# Patient Record
Sex: FEMALE | URBAN - METROPOLITAN AREA
[De-identification: names, ages, dates, MRNs, and addresses within clinical notes are randomized per-mention and may not be internally consistent; named-entity substitution may affect disease eponyms.]

---

## 2021-08-16 ENCOUNTER — OFFICE VISIT (OUTPATIENT)
Dept: URBAN - METROPOLITAN AREA CLINIC 6 | Facility: CLINIC | Age: 71
End: 2021-08-16
Payer: COMMERCIAL

## 2021-08-16 PROCEDURE — 76514 ECHO EXAM OF EYE THICKNESS: CPT | Performed by: OPTOMETRIST

## 2021-08-16 PROCEDURE — 92133 CPTRZD OPH DX IMG PST SGM ON: CPT | Performed by: OPTOMETRIST

## 2021-08-16 PROCEDURE — 92012 INTRM OPH EXAM EST PATIENT: CPT | Performed by: OPTOMETRIST

## 2021-08-16 PROCEDURE — 92083 EXTENDED VISUAL FIELD XM: CPT | Performed by: OPTOMETRIST

## 2021-08-16 ASSESSMENT — INTRAOCULAR PRESSURE
OD: 19
OS: 18

## 2021-08-16 NOTE — IMPRESSION/PLAN
Impression: Open angle with borderline findings, low risk, bilateral: H40.013. Plan: RNFL OCT scan shows thin quadrant sup OS. HVF 24-2 unreliable OD with nasal defects OD and reliable OS with nasal defects OS. Thick pachs OD>OS. Due to frequent antivegf injections will recheck in 6 moths.

## 2022-02-16 ENCOUNTER — OFFICE VISIT (OUTPATIENT)
Dept: URBAN - METROPOLITAN AREA CLINIC 6 | Facility: CLINIC | Age: 72
End: 2022-02-16
Payer: COMMERCIAL

## 2022-02-16 DIAGNOSIS — H40.013 OPEN ANGLE WITH BORDERLINE FINDINGS, LOW RISK, BILATERAL: Primary | ICD-10-CM

## 2022-02-16 PROCEDURE — 92133 CPTRZD OPH DX IMG PST SGM ON: CPT | Performed by: OPTOMETRIST

## 2022-02-16 PROCEDURE — 92083 EXTENDED VISUAL FIELD XM: CPT | Performed by: OPTOMETRIST

## 2022-02-16 PROCEDURE — 92012 INTRM OPH EXAM EST PATIENT: CPT | Performed by: OPTOMETRIST

## 2022-02-16 ASSESSMENT — INTRAOCULAR PRESSURE
OD: 22
OS: 22

## 2022-02-16 NOTE — IMPRESSION/PLAN
Impression: Open angle with borderline findings, low risk, bilateral: H40.013. Plan: RNFL OCT scan shows borderline inf quadrant OD and borderline sup OS. HVF 24-2 unreliable OD/OS with scattered non specific defects not repeatable.

## 2022-11-16 ENCOUNTER — OFFICE VISIT (OUTPATIENT)
Dept: URBAN - METROPOLITAN AREA CLINIC 6 | Facility: CLINIC | Age: 72
End: 2022-11-16
Payer: MEDICARE

## 2022-11-16 DIAGNOSIS — E11.3213 DIABETES MELLITUS TYPE 2 WITH MILD NON-PROLIFERATIVE RETINOPATHY WITH MACULAR EDEMA, BILATERAL: Primary | ICD-10-CM

## 2022-11-16 DIAGNOSIS — H40.013 OPEN ANGLE WITH BORDERLINE FINDINGS, LOW RISK, BILATERAL: ICD-10-CM

## 2022-11-16 DIAGNOSIS — H43.813 VITREOUS DEGENERATION, BILATERAL: ICD-10-CM

## 2022-11-16 DIAGNOSIS — H26.493 OTHER SECONDARY CATARACT, BILATERAL: ICD-10-CM

## 2022-11-16 PROCEDURE — 92014 COMPRE OPH EXAM EST PT 1/>: CPT | Performed by: OPTOMETRIST

## 2022-11-16 PROCEDURE — 92250 FUNDUS PHOTOGRAPHY W/I&R: CPT | Performed by: OPTOMETRIST

## 2022-11-16 ASSESSMENT — INTRAOCULAR PRESSURE
OS: 16
OD: 18

## 2022-11-16 NOTE — IMPRESSION/PLAN
Impression: Diabetes mellitus Type 2 with mild non-proliferative retinopathy with macular edema, bilateral: D47.7068. Plan: DM Type 2 with Mild NPDR with DME in both eyes- Under care of [facility]. Last appointment was about 4-5 months ago and next appointment will be tomorrow- CCrx with him.

## 2022-11-16 NOTE — IMPRESSION/PLAN
Impression: Open angle with borderline findings, low risk, bilateral: H40.013. Plan: Fundus photos ordered today show no change from previous. The pathophysiology of glaucoma and the difference between having glaucoma and being a glaucoma suspect were reviewed with the patient. All of the patients questions were answered and they stated they understand their finding. A Gamble 24-2 visual field and nerve fiber layer analysis by OCT, and fundus photos were ordered for next visit due to patient seeing [facility] twice a year.

## 2022-12-12 ENCOUNTER — OFFICE VISIT (OUTPATIENT)
Dept: URBAN - METROPOLITAN AREA CLINIC 6 | Facility: CLINIC | Age: 72
End: 2022-12-12
Payer: MEDICARE

## 2022-12-12 DIAGNOSIS — H20.012 PRIMARY IRIDOCYCLITIS OF LEFT EYE: ICD-10-CM

## 2022-12-12 PROCEDURE — 92012 INTRM OPH EXAM EST PATIENT: CPT | Performed by: OPTOMETRIST

## 2022-12-12 RX ORDER — BRIMONIDINE TARTRATE, TIMOLOL MALEATE 2; 5 MG/ML; MG/ML
SOLUTION/ DROPS OPHTHALMIC
Qty: 10 | Refills: 0 | Status: ACTIVE
Start: 2022-12-12

## 2022-12-12 RX ORDER — PREDNISOLONE ACETATE 10 MG/ML
1 % SUSPENSION/ DROPS OPHTHALMIC
Qty: 10 | Refills: 0 | Status: ACTIVE
Start: 2022-12-12

## 2022-12-12 RX ORDER — ACETAZOLAMIDE 250 MG/1
250 MG TABLET ORAL
Qty: 28 | Refills: 0 | Status: ACTIVE
Start: 2022-12-12

## 2022-12-12 ASSESSMENT — INTRAOCULAR PRESSURE
OS: 50
OD: 18

## 2022-12-12 NOTE — IMPRESSION/PLAN
Impression: Primary iridocyclitis of left eye: H20.012. Plan: Iritis from unknown etiology but may be related to diabetes and diabetic retinopathy. Start Prednisolone TID OS (shake well). Recommend consult with [address] [place] [address]. Plan to recheck here on Thursday for IOP check.

## 2022-12-12 NOTE — IMPRESSION/PLAN
Impression: Ocular hypertension, left eye: H40.052. Plan: Start Combigan TID OD and diamox 250 po BID.

## 2022-12-15 ENCOUNTER — OFFICE VISIT (OUTPATIENT)
Dept: URBAN - METROPOLITAN AREA CLINIC 6 | Facility: CLINIC | Age: 72
End: 2022-12-15
Payer: MEDICARE

## 2022-12-15 DIAGNOSIS — H40.052 OCULAR HYPERTENSION, LEFT EYE: Primary | ICD-10-CM

## 2022-12-15 PROCEDURE — 92012 INTRM OPH EXAM EST PATIENT: CPT | Performed by: OPTOMETRIST

## 2022-12-15 ASSESSMENT — INTRAOCULAR PRESSURE
OD: 10
OS: 12

## 2022-12-15 NOTE — IMPRESSION/PLAN
Impression: Ocular hypertension, left eye: H40.052. Plan: Significantly improved. Cont Combigan TID OD and diamox 250 po BID.

## 2022-12-15 NOTE — IMPRESSION/PLAN
Impression: Primary iridocyclitis of left eye: H20.012. Plan: Iritis from unknown etiology but may be related to diabetes and diabetic retinopathy. Increase Prednisolone 6 times a day OS (shake well). Recommend consult with [address] [place] [address].

## 2022-12-19 ENCOUNTER — OFFICE VISIT (OUTPATIENT)
Dept: URBAN - METROPOLITAN AREA CLINIC 6 | Facility: CLINIC | Age: 72
End: 2022-12-19
Payer: MEDICARE

## 2022-12-19 DIAGNOSIS — H20.012 PRIMARY IRIDOCYCLITIS OF LEFT EYE: Primary | ICD-10-CM

## 2022-12-19 DIAGNOSIS — H43.12 VITREOUS HEMORRHAGE, LEFT EYE: ICD-10-CM

## 2022-12-19 DIAGNOSIS — H40.052 OCULAR HYPERTENSION, LEFT EYE: ICD-10-CM

## 2022-12-19 PROCEDURE — 92014 COMPRE OPH EXAM EST PT 1/>: CPT | Performed by: OPTOMETRIST

## 2022-12-19 ASSESSMENT — INTRAOCULAR PRESSURE
OD: 13
OS: 18

## 2022-12-19 NOTE — IMPRESSION/PLAN
Impression: Vitreous hemorrhage, left eye: H43.12. Plan: Vitreous hemorrhage- Unable to perform B-scan due to no availability. Patient has appointment with [facility] on the 3rd week in January, but will try to be seen sooner.

## 2022-12-19 NOTE — IMPRESSION/PLAN
Impression: Primary iridocyclitis of left eye: H20.012. Plan: Iritis from unknown etiology but may be related to diabetes and diabetic retinopathy. Use Prednisolone 4 times a day OS (shake well). Recommend consult with [address] [place] [address].

## 2022-12-19 NOTE — IMPRESSION/PLAN
Impression: Ocular hypertension, left eye: H40.052. Plan: Significantly improved. IOP was 50 at ER visit 12/12/2022, now in the teens the last two visits. Continue Combigan TID OS and diamox 250 po.

## 2023-01-12 ENCOUNTER — OFFICE VISIT (OUTPATIENT)
Dept: URBAN - METROPOLITAN AREA CLINIC 6 | Facility: CLINIC | Age: 73
End: 2023-01-12
Payer: MEDICARE

## 2023-01-12 DIAGNOSIS — H43.12 VITREOUS HEMORRHAGE, LEFT EYE: ICD-10-CM

## 2023-01-12 DIAGNOSIS — H40.052 OCULAR HYPERTENSION, LEFT EYE: Primary | ICD-10-CM

## 2023-01-12 PROCEDURE — 92012 INTRM OPH EXAM EST PATIENT: CPT | Performed by: OPTOMETRIST

## 2023-01-12 RX ORDER — BRIMONIDINE TARTRATE, TIMOLOL MALEATE 2; 5 MG/ML; MG/ML
SOLUTION/ DROPS OPHTHALMIC
Qty: 5 | Refills: 2 | Status: ACTIVE
Start: 2023-01-12

## 2023-01-12 RX ORDER — ACETAZOLAMIDE 250 MG/1
250 MG TABLET ORAL
Qty: 28 | Refills: 0 | Status: ACTIVE
Start: 2023-01-12

## 2023-01-12 ASSESSMENT — INTRAOCULAR PRESSURE
OS: 54
OD: 17

## 2023-01-12 NOTE — IMPRESSION/PLAN
Impression: Ocular hypertension, left eye: H40.052. Plan: IOP was controlled after having pressure above 50 in mid December (was down in the teens with combigan TID and diamox). Pt has had treatments with [address] [place] [address] (possible vitrectomy, anti vegf), now pressure spiked again to 50+ despite still using combigan TID. Start diamox 250BID Po and request consult with Dr. Amor Francois. Follow up / IOP check Monday.

## 2023-01-12 NOTE — IMPRESSION/PLAN
Impression: Vitreous hemorrhage, left eye: H43.12. Plan: Continue care with [facility], [place], operated by [facility]. Next appt next week.